# Patient Record
Sex: MALE | Race: WHITE | NOT HISPANIC OR LATINO | Employment: FULL TIME | URBAN - NONMETROPOLITAN AREA
[De-identification: names, ages, dates, MRNs, and addresses within clinical notes are randomized per-mention and may not be internally consistent; named-entity substitution may affect disease eponyms.]

---

## 2023-08-07 ENCOUNTER — OFFICE VISIT (OUTPATIENT)
Dept: URGENT CARE | Facility: CLINIC | Age: 43
End: 2023-08-07

## 2023-08-07 VITALS
DIASTOLIC BLOOD PRESSURE: 91 MMHG | OXYGEN SATURATION: 98 % | BODY MASS INDEX: 31.15 KG/M2 | RESPIRATION RATE: 18 BRPM | HEIGHT: 72 IN | TEMPERATURE: 98.9 F | WEIGHT: 230 LBS | HEART RATE: 78 BPM | SYSTOLIC BLOOD PRESSURE: 151 MMHG

## 2023-08-07 DIAGNOSIS — H60.332 ACUTE SWIMMER'S EAR OF LEFT SIDE: Primary | ICD-10-CM

## 2023-08-07 DIAGNOSIS — H65.02 NON-RECURRENT ACUTE SEROUS OTITIS MEDIA OF LEFT EAR: ICD-10-CM

## 2023-08-07 PROCEDURE — G0382 LEV 3 HOSP TYPE B ED VISIT: HCPCS

## 2023-08-07 RX ORDER — OFLOXACIN 3 MG/ML
10 SOLUTION AURICULAR (OTIC) DAILY
Qty: 3.5 ML | Refills: 0 | Status: SHIPPED | OUTPATIENT
Start: 2023-08-07 | End: 2023-08-14

## 2023-08-07 RX ORDER — AMOXICILLIN 500 MG/1
500 CAPSULE ORAL EVERY 12 HOURS SCHEDULED
Qty: 14 CAPSULE | Refills: 0 | Status: SHIPPED | OUTPATIENT
Start: 2023-08-07 | End: 2023-08-14

## 2023-08-07 NOTE — PROGRESS NOTES
[facility]        NAME: Jenni Chun is a 37 y.o. male  : 1980    MRN: [id number]  DATE: 2023  TIME: 2:37 PM    Assessment and Plan   Acute swimmer's ear of left side [H60.332]  1. Acute swimmer's ear of left side  ofloxacin (FLOXIN) 0.3 % otic solution    amoxicillin (AMOXIL) 500 mg capsule      2. Non-recurrent acute serous otitis media of left ear  ofloxacin (FLOXIN) 0.3 % otic solution    amoxicillin (AMOXIL) 500 mg capsule        Otitis externa of left ear with otitis media. Will treat with amoxicillin as well as ofloxacin. Patient verbalized understanding. Patient Instructions   Take amoxicillin twice daily with food for 7 days  Use the ofloxacin drops 10 drops once daily for 7 days  Avoid going under water  Warm compress may help. Tylenol and motrin as needed   DO NOT put anything in the ear canal. No swimming or getting water in ear. Follow up with PCP in 3-5 days. Proceed to  ER if symptoms worsen. Chief Complaint     Chief Complaint   Patient presents with   • Earache     Left sided ear pain          History of Present Illness       Patient is a 49-year-old male presents to the office today for left ear pain. He states that he just returned from a cruise in which he was doing a lot of snorkeling in [facility], the [facility], and [facility].  He states that he was snorkeling with sea turtles and noted that there was high levels of allergy and turtle poop in the water. He complains of pain while driving and changing elevation. He feels pressure in the ear and notes swelling of the tragus. Denies any fever or chills. Denies drainage. Review of Systems   Review of Systems   HENT: Positive for ear pain. Negative for congestion, ear discharge, postnasal drip, rhinorrhea and sore throat. Respiratory: Negative for cough. All other systems reviewed and are negative.         Current Medications       Current Outpatient Medications:   •  amoxicillin (AMOXIL) 500 mg capsule, Take 1 capsule (500 mg total) by mouth every 12 (twelve) hours for 7 days, Disp: 14 capsule, Rfl: 0  •  ofloxacin (FLOXIN) 0.3 % otic solution, Administer 10 drops into the left ear daily for 7 days, Disp: 3.5 mL, Rfl: 0    Current Allergies     Allergies as of 08/07/2023   • (No Known Allergies)            The following portions of the patient's history were reviewed and updated as appropriate: allergies, current medications, past family history, past medical history, past social history, past surgical history and problem list.     History reviewed. No pertinent past medical history. History reviewed. No pertinent surgical history. History reviewed. No pertinent family history. Medications have been verified. Objective   /91   Pulse 78   Temp 98.9 °F (37.2 °C)   Resp 18   Ht 6' (1.829 m)   Wt 104 kg (230 lb)   SpO2 98%   BMI 31.19 kg/m²        Physical Exam     Physical Exam  Vitals and nursing note reviewed. Constitutional:       Appearance: Normal appearance. He is normal weight. HENT:      Right Ear: Hearing, tympanic membrane, ear canal and external ear normal.      Left Ear: Tenderness present. Tympanic membrane is erythematous and bulging. Ears:        Comments: Swelling of the pinna and tragus of the ear. There is swelling of the canal noted on the left side. There is tenderness upon speculum examination. There is erythema and edema noted in the canal as well as purulent drainage. The tympanic membrane is erythematous and bulging. Preauricular and postauricular lymph nodes noted On the left side  Cardiovascular:      Rate and Rhythm: Normal rate and regular rhythm. Pulses: Normal pulses. Pulmonary:      Effort: Pulmonary effort is normal.   Neurological:      Mental Status: He is alert.

## 2023-08-07 NOTE — PATIENT INSTRUCTIONS
Take amoxicillin twice daily with food for 7 days  Use the ofloxacin drops 10 drops once daily for 7 days  Avoid going under water  Warm compress may help. Tylenol and motrin as needed   DO NOT put anything in the ear canal. No swimming or getting water in ear.